# Patient Record
Sex: FEMALE | Race: WHITE | URBAN - NONMETROPOLITAN AREA
[De-identification: names, ages, dates, MRNs, and addresses within clinical notes are randomized per-mention and may not be internally consistent; named-entity substitution may affect disease eponyms.]

---

## 2020-12-02 ENCOUNTER — IMPORTED ENCOUNTER (OUTPATIENT)
Dept: URBAN - NONMETROPOLITAN AREA CLINIC 1 | Facility: CLINIC | Age: 60
End: 2020-12-02

## 2020-12-02 PROBLEM — H43.811: Noted: 2020-12-02

## 2020-12-02 PROCEDURE — 99203 OFFICE O/P NEW LOW 30 MIN: CPT

## 2020-12-02 NOTE — PATIENT DISCUSSION
PVD OD-  discussed findings w/ patient-  no holes tears or detachments noted-  carefully reviewed signs/ symptoms associated w/ retinal detachements-  ptaitne insturcted to come in or call if any changes-  placed on immedaiate restrictions: no bending at the waist lifting more than 5 lbs no strenous activitiy for at least 2 weeks-  must sleep at 45 degree angle until seen at f/u-  monitor prn-  RTC: 6 week f/u PVD; 's Notes: DFE 12/2/2020

## 2021-01-20 ENCOUNTER — IMPORTED ENCOUNTER (OUTPATIENT)
Dept: URBAN - NONMETROPOLITAN AREA CLINIC 1 | Facility: CLINIC | Age: 61
End: 2021-01-20

## 2021-01-20 PROCEDURE — 99213 OFFICE O/P EST LOW 20 MIN: CPT

## 2022-04-09 ASSESSMENT — VISUAL ACUITY
OS_CC: 20/70+2
OD_CC: 20/30+2 BLURRY
OD_CC: 20/25-2
OS_CC: 20/60
OS_PH: 20/25
OS_PH: 20/30-

## 2022-04-09 ASSESSMENT — TONOMETRY
OS_IOP_MMHG: 17
OD_IOP_MMHG: 18
OS_IOP_MMHG: 18
OD_IOP_MMHG: 17